# Patient Record
Sex: MALE | Race: WHITE | NOT HISPANIC OR LATINO | Employment: OTHER | URBAN - METROPOLITAN AREA
[De-identification: names, ages, dates, MRNs, and addresses within clinical notes are randomized per-mention and may not be internally consistent; named-entity substitution may affect disease eponyms.]

---

## 2018-06-18 ENCOUNTER — HOSPITAL ENCOUNTER (EMERGENCY)
Facility: HOSPITAL | Age: 83
Discharge: HOME | End: 2018-06-18
Attending: EMERGENCY MEDICINE | Admitting: EMERGENCY MEDICINE
Payer: COMMERCIAL

## 2018-06-18 ENCOUNTER — APPOINTMENT (EMERGENCY)
Dept: RADIOLOGY | Facility: HOSPITAL | Age: 83
End: 2018-06-18
Payer: COMMERCIAL

## 2018-06-18 VITALS
DIASTOLIC BLOOD PRESSURE: 66 MMHG | RESPIRATION RATE: 16 BRPM | SYSTOLIC BLOOD PRESSURE: 147 MMHG | WEIGHT: 175.71 LBS | TEMPERATURE: 98.3 F | HEART RATE: 74 BPM | OXYGEN SATURATION: 97 % | BODY MASS INDEX: 22.56 KG/M2

## 2018-06-18 DIAGNOSIS — T67.5XXA HEAT EXHAUSTION, INITIAL ENCOUNTER: Primary | ICD-10-CM

## 2018-06-18 LAB
ALBUMIN SERPL BCP-MCNC: 3.4 G/DL (ref 3.5–5)
ALP SERPL-CCNC: 80 U/L (ref 46–116)
ALT SERPL W P-5'-P-CCNC: 29 U/L (ref 12–78)
ANION GAP SERPL CALCULATED.3IONS-SCNC: 7 MMOL/L (ref 4–13)
AST SERPL W P-5'-P-CCNC: 22 U/L (ref 5–45)
BASOPHILS # BLD AUTO: 0.03 THOUSANDS/ΜL (ref 0–0.1)
BASOPHILS NFR BLD AUTO: 0 % (ref 0–1)
BILIRUB SERPL-MCNC: 0.5 MG/DL (ref 0.2–1)
BILIRUB UR QL STRIP: NEGATIVE
BUN SERPL-MCNC: 16 MG/DL (ref 5–25)
CALCIUM SERPL-MCNC: 9 MG/DL (ref 8.3–10.1)
CHLORIDE SERPL-SCNC: 102 MMOL/L (ref 100–108)
CLARITY UR: CLEAR
CO2 SERPL-SCNC: 30 MMOL/L (ref 21–32)
COLOR UR: YELLOW
CREAT SERPL-MCNC: 0.89 MG/DL (ref 0.6–1.3)
EOSINOPHIL # BLD AUTO: 0.33 THOUSAND/ΜL (ref 0–0.61)
EOSINOPHIL NFR BLD AUTO: 4 % (ref 0–6)
ERYTHROCYTE [DISTWIDTH] IN BLOOD BY AUTOMATED COUNT: 13.3 % (ref 11.6–15.1)
GFR SERPL CREATININE-BSD FRML MDRD: 76 ML/MIN/1.73SQ M
GLUCOSE SERPL-MCNC: 107 MG/DL (ref 65–140)
GLUCOSE UR STRIP-MCNC: NEGATIVE MG/DL
HCT VFR BLD AUTO: 42.3 % (ref 36.5–49.3)
HGB BLD-MCNC: 14.5 G/DL (ref 12–17)
HGB UR QL STRIP.AUTO: NEGATIVE
KETONES UR STRIP-MCNC: ABNORMAL MG/DL
LEUKOCYTE ESTERASE UR QL STRIP: NEGATIVE
LYMPHOCYTES # BLD AUTO: 2.1 THOUSANDS/ΜL (ref 0.6–4.47)
LYMPHOCYTES NFR BLD AUTO: 28 % (ref 14–44)
MAGNESIUM SERPL-MCNC: 2.2 MG/DL (ref 1.6–2.6)
MCH RBC QN AUTO: 32.2 PG (ref 26.8–34.3)
MCHC RBC AUTO-ENTMCNC: 34.3 G/DL (ref 31.4–37.4)
MCV RBC AUTO: 94 FL (ref 82–98)
MONOCYTES # BLD AUTO: 0.85 THOUSAND/ΜL (ref 0.17–1.22)
MONOCYTES NFR BLD AUTO: 11 % (ref 4–12)
NEUTROPHILS # BLD AUTO: 4.12 THOUSANDS/ΜL (ref 1.85–7.62)
NEUTS SEG NFR BLD AUTO: 56 % (ref 43–75)
NITRITE UR QL STRIP: NEGATIVE
NT-PROBNP SERPL-MCNC: 78 PG/ML
PH UR STRIP.AUTO: 6 [PH] (ref 4.5–8)
PLATELET # BLD AUTO: 247 THOUSANDS/UL (ref 149–390)
PMV BLD AUTO: 9.7 FL (ref 8.9–12.7)
POTASSIUM SERPL-SCNC: 4 MMOL/L (ref 3.5–5.3)
PROT SERPL-MCNC: 6.9 G/DL (ref 6.4–8.2)
PROT UR STRIP-MCNC: NEGATIVE MG/DL
RBC # BLD AUTO: 4.51 MILLION/UL (ref 3.88–5.62)
SODIUM SERPL-SCNC: 139 MMOL/L (ref 136–145)
SP GR UR STRIP.AUTO: <=1.005 (ref 1–1.03)
TROPONIN I SERPL-MCNC: <0.02 NG/ML
UROBILINOGEN UR QL STRIP.AUTO: 0.2 E.U./DL
WBC # BLD AUTO: 7.43 THOUSAND/UL (ref 4.31–10.16)

## 2018-06-18 PROCEDURE — 36415 COLL VENOUS BLD VENIPUNCTURE: CPT | Performed by: EMERGENCY MEDICINE

## 2018-06-18 PROCEDURE — 96361 HYDRATE IV INFUSION ADD-ON: CPT

## 2018-06-18 PROCEDURE — 99284 EMERGENCY DEPT VISIT MOD MDM: CPT

## 2018-06-18 PROCEDURE — 81003 URINALYSIS AUTO W/O SCOPE: CPT | Performed by: EMERGENCY MEDICINE

## 2018-06-18 PROCEDURE — 93005 ELECTROCARDIOGRAM TRACING: CPT

## 2018-06-18 PROCEDURE — 85025 COMPLETE CBC W/AUTO DIFF WBC: CPT | Performed by: EMERGENCY MEDICINE

## 2018-06-18 PROCEDURE — 84484 ASSAY OF TROPONIN QUANT: CPT | Performed by: EMERGENCY MEDICINE

## 2018-06-18 PROCEDURE — 96360 HYDRATION IV INFUSION INIT: CPT

## 2018-06-18 PROCEDURE — 80053 COMPREHEN METABOLIC PANEL: CPT | Performed by: EMERGENCY MEDICINE

## 2018-06-18 PROCEDURE — 83735 ASSAY OF MAGNESIUM: CPT | Performed by: EMERGENCY MEDICINE

## 2018-06-18 PROCEDURE — 71045 X-RAY EXAM CHEST 1 VIEW: CPT

## 2018-06-18 PROCEDURE — 83880 ASSAY OF NATRIURETIC PEPTIDE: CPT | Performed by: EMERGENCY MEDICINE

## 2018-06-18 RX ORDER — SODIUM CHLORIDE 9 MG/ML
125 INJECTION, SOLUTION INTRAVENOUS CONTINUOUS
Status: DISCONTINUED | OUTPATIENT
Start: 2018-06-18 | End: 2018-06-19 | Stop reason: HOSPADM

## 2018-06-18 RX ADMIN — SODIUM CHLORIDE 125 ML/HR: 0.9 INJECTION, SOLUTION INTRAVENOUS at 20:43

## 2018-06-19 LAB
ATRIAL RATE: 72 BPM
P AXIS: 72 DEGREES
PR INTERVAL: 182 MS
QRS AXIS: 60 DEGREES
QRSD INTERVAL: 90 MS
QT INTERVAL: 366 MS
QTC INTERVAL: 403 MS
T WAVE AXIS: 70 DEGREES
VENTRICULAR RATE: 73 BPM

## 2018-06-19 PROCEDURE — 93010 ELECTROCARDIOGRAM REPORT: CPT | Performed by: INTERNAL MEDICINE

## 2018-06-19 NOTE — DISCHARGE INSTRUCTIONS
Heat Exhaustion   WHAT YOU NEED TO KNOW:   Heat exhaustion is when your body overheats  Normally, the body has a cooling system that is controlled by the brain  The cooling system adjusts to hot conditions and lowers your body temperature by producing sweat  With heat exhaustion, the body's cooling system is not working well and results in an increased body temperature  DISCHARGE INSTRUCTIONS:   Call 911 for any of the following:   · You have trouble breathing  · You are confused or cannot think clearly  · You cannot move your arms and legs  Return to the emergency department if:   · You cannot stop vomiting  Contact your healthcare provider if:   · Your signs and symptoms do not improve with treatment  · You have numbness or prickling feeling in your arms or legs  · You have questions or concerns about your condition or care  First aid for heat exhaustion:   · Move to an air-conditioned location or a cool, shady area and lie down  Raise your legs above the level of your heart  · Drink cold liquid, such as water or a sports drink  · Mist yourself with cold water or pour cool water on your head, neck, and clothes  · Loosen or remove as many clothes as possible  · If you do not feel better in 1 hour, go to the emergency department  Prevent heat exhaustion:   · Wear lightweight, loose, and light-colored clothing  · Protect your head and neck with a hat or umbrella when you are outdoors  · Drink lots of water or sports drinks  Avoid alcohol  · Eat salty foods, such as salted crackers, and salted pretzels  · Limit your activities during the hottest time of the day  This is usually late morning through early afternoon  · Use air conditioners or fans and have enough proper ventilation  If there is no air conditioning available, keep your windows open so air can circulate    Follow up with your healthcare provider as directed:  Write down your questions so you remember to ask them during your visits  © 2017 [employer] Information is for End User's use only and may not be sold, redistributed or otherwise used for commercial purposes  All illustrations and images included in CareNotes® are the copyrighted property of A D A M , Inc  or Jourdan Romero  The above information is an  only  It is not intended as medical advice for individual conditions or treatments  Talk to your doctor, nurse or pharmacist before following any medical regimen to see if it is safe and effective for you

## 2018-06-19 NOTE — ED NOTES
Pt discharge instructions reviewed, Pt has no further questions at this time  Pt awake and alert, no signs of acute distress noted         Courtney Aguilar RN  06/18/18 1597

## 2018-06-19 NOTE — ED NOTES
Pt resting in bed finishing his NSS fluids as requested, family bedside, discharge papers given along with resources, VSS, will continue to monitor       Raimundo Heath RN  06/18/18 5294

## 2018-06-19 NOTE — ED PROVIDER NOTES
History  Chief Complaint   Patient presents with    Dehydration     daughter reports "he doesnt want to eat or drink anything  it happened before and he dehydrated"      80-year-old gentleman brought in for generalized weakness  Daughter reports that he did not eat or drink much all day  Also refused turn on the air conditioning in his house  Is 90+ degrees outside  History provided by:  Patient and parent   used: No    Malaise - 7 years or greater   Severity:  Mild  Onset quality:  Sudden  Duration:  12 hours  Timing:  Constant  Progression:  Worsening  Context: dehydration    Worsened by: Activity  Associated symptoms: anorexia    Associated symptoms: no abdominal pain, no arthralgias, no chest pain, no cough, no dizziness, no headaches, no seizures and no vomiting    Risk factors: no anemia, no heart disease and no new medications        None       Past Medical History:   Diagnosis Date    [facility] )     [facility])        Past Surgical History:   Procedure Laterality Date    PROSTATE SURGERY         History reviewed  No pertinent family history  I have reviewed and agree with the history as documented  Social History   Substance Use Topics    Smoking status: Never Smoker    Smokeless tobacco: Never Used    Alcohol use Not on file      Comment: one with dinner every night        Review of Systems   Constitutional: Negative for activity change and appetite change  HENT: Negative for congestion and facial swelling  Eyes: Negative for discharge and redness  Respiratory: Negative for cough and wheezing  Cardiovascular: Negative for chest pain and leg swelling  Gastrointestinal: Positive for anorexia  Negative for abdominal distention, abdominal pain, blood in stool and vomiting  Endocrine: Negative for cold intolerance and polydipsia  Genitourinary: Negative for difficulty urinating and hematuria     Musculoskeletal: Negative for arthralgias and gait problem  Skin: Negative for color change and rash  Allergic/Immunologic: Negative for food allergies and immunocompromised state  Neurological: Negative for dizziness, seizures and headaches  Hematological: Negative for adenopathy  Does not bruise/bleed easily  Psychiatric/Behavioral: Negative for agitation, confusion and decreased concentration  All other systems reviewed and are negative  Physical Exam  Physical Exam   Constitutional: He is oriented to person, place, and time  He appears well-developed and well-nourished  Non-toxic appearance  HENT:   Head: Normocephalic and atraumatic  Right Ear: Tympanic membrane normal    Left Ear: Tympanic membrane normal    Nose: Nose normal    Mouth/Throat: Oropharynx is clear and moist    Eyes: Conjunctivae, EOM and lids are normal  Pupils are equal, round, and reactive to light  Neck: Trachea normal and normal range of motion  Neck supple  No JVD present  Carotid bruit is not present  Cardiovascular: Normal rate, regular rhythm, normal heart sounds and intact distal pulses  No extrasystoles are present  Pulmonary/Chest: Effort normal  He has no decreased breath sounds  He has no wheezes  He has no rhonchi  He has no rales  He exhibits no tenderness and no deformity  Abdominal: Soft  Bowel sounds are normal  There is no tenderness  There is no rebound, no guarding and no CVA tenderness  Musculoskeletal:        Right shoulder: He exhibits normal range of motion, no tenderness, no swelling and no deformity  Cervical back: Normal  He exhibits normal range of motion, no tenderness, no bony tenderness and no deformity  Lymphadenopathy:     He has no cervical adenopathy  He has no axillary adenopathy  Neurological: He is alert and oriented to person, place, and time  He has normal strength and normal reflexes  No cranial nerve deficit or sensory deficit  He displays a negative Romberg sign  Skin: Skin is warm and dry  Psychiatric: He has a normal mood and affect  His speech is normal and behavior is normal  Judgment and thought content normal  Cognition and memory are normal    Nursing note and vitals reviewed        Vital Signs  ED Triage Vitals   Temperature Pulse Respirations Blood Pressure SpO2   06/18/18 1924 06/18/18 1924 06/18/18 1924 06/18/18 1924 06/18/18 1924   98 3 °F (36 8 °C) 73 18 123/64 97 %      Temp Source Heart Rate Source Patient Position - Orthostatic VS BP Location FiO2 (%)   06/18/18 1924 06/18/18 1924 06/18/18 1924 06/18/18 1924 --   Oral Monitor Lying Left arm       Pain Score       06/18/18 2020       No Pain           Vitals:    06/18/18 2230 06/18/18 2245 06/18/18 2300 06/18/18 2330   BP: 134/63  145/67 147/66   Pulse: 70 72 72 74   Patient Position - Orthostatic VS: Lying  Lying Lying       Visual Acuity      ED Medications  Medications - No data to display    Diagnostic Studies  Results Reviewed     Procedure Component Value Units Date/Time    UA w Reflex to Microscopic w Reflex to Culture [[id number]]  (Abnormal) Collected:  06/18/18 2220    Lab Status:  Final result Specimen:  Urine from Urine, Clean Catch Updated:  06/18/18 2229     Color, UA Yellow     Clarity, UA Clear     Specific Gravity, UA <=1 005     pH, UA 6 0     Leukocytes, UA Negative     Nitrite, UA Negative     Protein, UA Negative mg/dl      Glucose, UA Negative mg/dl      Ketones, UA 15 (1+) (A) mg/dl      Urobilinogen, UA 0 2 E U /dl      Bilirubin, UA Negative     Blood, UA Negative    Magnesium [[id number]]  (Normal) Collected:  06/18/18 2041    Lab Status:  Final result Specimen:  Blood from Arm, Right Updated:  06/18/18 2121     Magnesium 2 2 mg/dL     B-type natriuretic peptide [[id number]]  (Normal) Collected:  06/18/18 2041    Lab Status:  Final result Specimen:  Blood from Arm, Right Updated:  06/18/18 2121     NT-proBNP 78 pg/mL     Troponin I [[id number]]  (Normal) Collected:  06/18/18 2041    Lab Status:  Final result Specimen: Blood from Arm, Right Updated:  06/18/18 2116     Troponin I <0 02 ng/mL     Comprehensive metabolic panel [[id number]]  (Abnormal) Collected:  06/18/18 2041    Lab Status:  Final result Specimen:  Blood from Arm, Right Updated:  06/18/18 2114     Sodium 139 mmol/L      Potassium 4 0 mmol/L      Chloride 102 mmol/L      CO2 30 mmol/L      Anion Gap 7 mmol/L      BUN 16 mg/dL      Creatinine 0 89 mg/dL      Glucose 107 mg/dL      Calcium 9 0 mg/dL      AST 22 U/L      ALT 29 U/L      Alkaline Phosphatase 80 U/L      Total Protein 6 9 g/dL      Albumin 3 4 (L) g/dL      Total Bilirubin 0 50 mg/dL      eGFR 76 ml/min/1 73sq m     Narrative:         National Kidney Disease Education Program recommendations are as follows:  GFR calculation is accurate only with a steady state creatinine  Chronic Kidney disease less than 60 ml/min/1 73 sq  meters  Kidney failure less than 15 ml/min/1 73 sq  meters      CBC and differential [[id number]]  (Normal) Collected:  06/18/18 2041    Lab Status:  Final result Specimen:  Blood from Arm, Right Updated:  06/18/18 2058     WBC 7 43 Thousand/uL      RBC 4 51 Million/uL      Hemoglobin 14 5 g/dL      Hematocrit 42 3 %      MCV 94 fL      MCH 32 2 pg      MCHC 34 3 g/dL      RDW 13 3 %      MPV 9 7 fL      Platelets 091 Thousands/uL      Neutrophils Relative 56 %      Lymphocytes Relative 28 %      Monocytes Relative 11 %      Eosinophils Relative 4 %      Basophils Relative 0 %      Neutrophils Absolute 4 12 Thousands/µL      Lymphocytes Absolute 2 10 Thousands/µL      Monocytes Absolute 0 85 Thousand/µL      Eosinophils Absolute 0 33 Thousand/µL      Basophils Absolute 0 03 Thousands/µL                  XR chest 1 view portable   Final Result by Valeria Hough MD (06/19 0703)      No evidence of acute abnormality in the chest             Workstation performed: [id number]                    Procedures  Procedures       Phone Contacts  ED Phone Contact    ED Course MDM  Number of Diagnoses or Management Options  Heat exhaustion, initial encounter: new and requires workup     Amount and/or Complexity of Data Reviewed  Clinical lab tests: ordered and reviewed  Tests in the radiology section of CPT®: ordered and reviewed  Tests in the medicine section of CPT®: ordered and reviewed    Risk of Complications, Morbidity, and/or Mortality  General comments: Discussed with family gave them information for the visiting nursing so sedation to come out to evaluate parents home and also for local geriatric shins    Patient Progress  Patient progress: stable    CritCare Time    Disposition  Final diagnoses:   Heat exhaustion, initial encounter     Time reflects when diagnosis was documented in both MDM as applicable and the Disposition within this note     Time User Action Codes Description Comment    6/18/2018 10:52 PM Abdirahman BRAND Add Chris Law  5XXA] Heat exhaustion, initial encounter       ED Disposition     ED Disposition Condition Comment    Discharge  [facility] [address] discharge to home/self care  Condition at discharge: Good        Follow-up Information     Follow up With Specialties Details Why [address] Internal Medicine Schedule an appointment as soon as possible for a visit  [address] [place] [address]             There are no discharge medications for this patient  No discharge procedures on file      ED Provider  Electronically Signed by           Juanis Whitt DO  06/20/18 9805

## 2018-07-11 ENCOUNTER — OFFICE VISIT (OUTPATIENT)
Dept: SURGICAL ONCOLOGY | Facility: CLINIC | Age: 83
End: 2018-07-11
Payer: COMMERCIAL

## 2018-07-11 VITALS
HEART RATE: 82 BPM | HEIGHT: 74 IN | WEIGHT: 174 LBS | TEMPERATURE: 97.7 F | SYSTOLIC BLOOD PRESSURE: 160 MMHG | RESPIRATION RATE: 16 BRPM | BODY MASS INDEX: 22.33 KG/M2 | DIASTOLIC BLOOD PRESSURE: 70 MMHG

## 2018-07-11 DIAGNOSIS — C44.629 SQUAMOUS CELL CARCINOMA OF LEFT UPPER EXTREMITY: Primary | ICD-10-CM

## 2018-07-11 PROCEDURE — 99204 OFFICE O/P NEW MOD 45 MIN: CPT | Performed by: SURGERY

## 2018-07-11 NOTE — LETTER
July 11, 2018     Marcia Gardner  [address] [place] [address] [facility] [place]  [address] [place] [address]    Patient: Nany Gonzales   YOB: 1928   Date of Visit: 7/11/2018       Dear Dr Trinidad Hay:    Thank you for referring Angelina Gay to me for evaluation  Below are my notes for this consultation  If you have questions, please do not hesitate to call me  I look forward to following your patient along with you  Sincerely,        Edwin Lemons MD        CC: No Recipients  Edwin Lemons MD  7/11/2018  1:30 PM  Sign at close encounter               Surgical Oncology Follow Up       [address] [place] [address]  8/5/1928  [id number]  [address] [facility],[address]  [facility] [address] [address]    Chief Complaint   Patient presents with   [address] [place] [address] patient here about basal cell on elbow       Assessment/Plan:    No problem-specific Assessment & Plan notes found for this encounter  Diagnoses and all orders for this visit:    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        Advance Care Planning/Advance Directives:  Discussed disease status, cancer treatment plans and/or cancer treatment goals with the patient  No history exists  History of Present Illness:   Patient is an 80year old man with a left elbow lesion which was noticed by the Dermatology team   Biopsy was done confirming squamous cell cancer  The patient reports that he had a itchy skin lesion there which she had been scratching for some time  He has had history of other skin cancers treated in the past   He has fair skin and sunburns easily  Review of Systems   Constitutional: Negative  HENT: Negative  Eyes: Negative  Respiratory: Negative  Cardiovascular: Negative  Gastrointestinal: Negative  Endocrine: Negative  Genitourinary: Negative  Musculoskeletal: Negative  Skin:        Left elbow skin lesion   Allergic/Immunologic: Negative  Neurological: Negative  Hematological: Negative  Psychiatric/Behavioral: Negative  Patient Active Problem List   Diagnosis    [facility] cancer ([facility] )    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     Past Medical History:   Diagnosis Date    Melanoma ([facility] )     Prostate CA ([facility] )      Past Surgical History:   Procedure Laterality Date    PROSTATE SURGERY       Family History   Problem Relation Age of Onset    Lung cancer Father      Social History     Social History    Marital status: /Civil Union     Spouse name: N/A    Number of children: N/A    Years of education: N/A     Occupational History    Not on file  Social History Main Topics    Smoking status: Never Smoker    Smokeless tobacco: Never Used    Alcohol use 0 6 oz/week     1 Cans of beer per week      Comment: one with dinner every night    Drug use: No    Sexual activity: Not on file     Other Topics Concern    Not on file     Social History Narrative    No narrative on file     No current outpatient prescriptions on file  No Known Allergies  Vitals:    07/11/18 1256   BP: 160/70   Pulse: 82   Resp: 16   Temp: 97 7 °F (36 5 °C)       Physical Exam   Constitutional: He is oriented to person, place, and time  Eyes: Conjunctivae are normal  Pupils are equal, round, and reactive to light  Neck: Normal range of motion  Neck supple  Cardiovascular: Normal rate, regular rhythm, normal heart sounds and intact distal pulses  Pulmonary/Chest: Effort normal and breath sounds normal    Abdominal: Soft  Musculoskeletal: Normal range of motion  Neurological: He is alert and oriented to person, place, and time  Skin:   12 mm diameter of ulcerated lesion left elbow         Pathology:  Case Report   Surgical Pathology Report                         Case: [id number]                                    Authorizing Provider: Alexx Ramírez MD        Collected:           06/20/2018                    Pathologist:           Farideh Benjamin MD           Received:            06/20/2018 0805               Specimen:    Skin, Other, Left Elbow (4 slides  [facility],collected on  06/06/2018)       Final Diagnosis   A  Skin,  Left Elbow (4 slides [id number], L1 & L2,  [facility],collected on  06/06/2018):  - Ulcerated invasive squamous cell carcinoma, moderately to poorly differentiated with infiltrative     features, present with squamous cell carcinoma in-situ and occurring in a background of cellular     myxoid stroma with atypical features  See Note  -- Carcinoma extends to tissue borders       Interpretation performed at [facility], [address] [place] [address]  Labs:  CBC, Coags, BMP, Mg, Phos     Imaging  Xr Chest 1 View Portable    Result Date: 6/19/2018  Narrative: CHEST INDICATION:   weak  COMPARISON:  None EXAM PERFORMED/VIEWS:  XR CHEST PORTABLE FINDINGS: Heart mildly enlarged  Aorta uncoiled  Central pulmonary arteries prominent  Peripheral pulmonary vessels unremarkable  The lungs are clear  No pneumothorax or pleural effusion  Osseous structures appear within normal limits for patient age  Impression: No evidence of acute abnormality in the chest  Workstation performed: [id number]     I reviewed the above laboratory and imaging data  Discussion/Summary:  Left elbow squamous cell cancer  I believe this is amenable to in office excision  Patient is daughter are amenable to this and will schedule a time and date for in office procedure

## 2018-07-11 NOTE — PROGRESS NOTES
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        Advance Care Planning/Advance Directives:  Discussed disease status, cancer treatment plans and/or cancer treatment goals with the patient  No history exists  History of Present Illness:   Patient is an 80year old man with a left elbow lesion which was noticed by the Dermatology team   Biopsy was done confirming squamous cell cancer  The patient reports that he had a itchy skin lesion there which she had been scratching for some time  He has had history of other skin cancers treated in the past   He has fair skin and sunburns easily  Review of Systems   Constitutional: Negative  HENT: Negative  Eyes: Negative  Respiratory: Negative  Cardiovascular: Negative  Gastrointestinal: Negative  Endocrine: Negative  Genitourinary: Negative  Musculoskeletal: Negative  Skin:        Left elbow skin lesion   Allergic/Immunologic: Negative  Neurological: Negative  Hematological: Negative  Psychiatric/Behavioral: Negative            Patient Active Problem List   Diagnosis    [facility] )    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     Past Medical History:   Diagnosis Date    Melanoma ([facility] )     [facility]state CA [facility])      Past Surgical History:   Procedure Laterality Date    PROSTATE SURGERY       Family History   Problem Relation Age of Onset    Lung cancer Father      Social History     Social History    Marital status: /Civil Union     Spouse name: N/A    Number of children: N/A    Years of education: N/A     Occupational History    Not on file  Social History Main Topics    Smoking status: Never Smoker    Smokeless tobacco: Never Used    Alcohol use 0 6 oz/week     1 Cans of beer per week      Comment: one with dinner every night    Drug use: No    Sexual activity: Not on file     Other Topics Concern    Not on file     Social History Narrative    No narrative on file     No current outpatient prescriptions on file  No Known Allergies  Vitals:    07/11/18 1256   BP: 160/70   Pulse: 82   Resp: 16   Temp: 97 7 °F (36 5 °C)       Physical Exam   Constitutional: He is oriented to person, place, and time  Eyes: Conjunctivae are normal  Pupils are equal, round, and reactive to light  Neck: Normal range of motion  Neck supple  Cardiovascular: Normal rate, regular rhythm, normal heart sounds and intact distal pulses  Pulmonary/Chest: Effort normal and breath sounds normal    Abdominal: Soft  Musculoskeletal: Normal range of motion  Neurological: He is alert and oriented to person, place, and time  Skin:   12 mm diameter of ulcerated lesion left elbow  Pathology:  Case Report   Surgical Pathology Report                         Case: [id number]                                    Authorizing Provider: Luzmaria Tinoco MD        Collected:           06/20/2018                    Pathologist:           Samual Ormond, MD           Received:            06/20/2018 0805               Specimen:    Skin, Other, Left Elbow (4 slides  [facility],collected on  06/06/2018)       Final Diagnosis   A   Skin,  Left Elbow (4 slides [id number], L1 & L2,  [facility],collected on  06/06/2018):  - Ulcerated invasive squamous cell carcinoma, moderately to poorly differentiated with infiltrative     features, present with squamous cell carcinoma in-situ and occurring in a background of cellular     myxoid stroma with atypical features  See Note  -- Carcinoma extends to tissue borders       Interpretation performed at [facility], [facility] [address]  Labs:  CBC, Coags, BMP, Mg, Phos     Imaging  Xr Chest 1 View Portable    Result Date: 6/19/2018  Narrative: CHEST INDICATION:   weak  COMPARISON:  None EXAM PERFORMED/VIEWS:  XR CHEST PORTABLE FINDINGS: Heart mildly enlarged  Aorta uncoiled  Central pulmonary arteries prominent  Peripheral pulmonary vessels unremarkable  The lungs are clear  No pneumothorax or pleural effusion  Osseous structures appear within normal limits for patient age  Impression: No evidence of acute abnormality in the chest  Workstation performed: [id number]     I reviewed the above laboratory and imaging data  Discussion/Summary:  Left elbow squamous cell cancer  I believe this is amenable to in office excision  Patient is daughter are amenable to this and will schedule a time and date for in office procedure

## 2018-08-17 ENCOUNTER — PROCEDURE VISIT (OUTPATIENT)
Dept: SURGICAL ONCOLOGY | Facility: CLINIC | Age: 83
End: 2018-08-17
Payer: COMMERCIAL

## 2018-08-17 VITALS
RESPIRATION RATE: 16 BRPM | DIASTOLIC BLOOD PRESSURE: 60 MMHG | HEART RATE: 74 BPM | BODY MASS INDEX: 21.05 KG/M2 | HEIGHT: 74 IN | WEIGHT: 164 LBS | TEMPERATURE: 98.3 F | SYSTOLIC BLOOD PRESSURE: 120 MMHG

## 2018-08-17 DIAGNOSIS — C44.629 SQUAMOUS CELL CARCINOMA OF SKIN OF LEFT ELBOW: Primary | ICD-10-CM

## 2018-08-17 PROCEDURE — 88305 TISSUE EXAM BY PATHOLOGIST: CPT | Performed by: PATHOLOGY

## 2018-08-17 PROCEDURE — 11602 EXC TR-EXT MAL+MARG 1.1-2 CM: CPT | Performed by: SURGERY

## 2018-08-17 PROCEDURE — 99214 OFFICE O/P EST MOD 30 MIN: CPT | Performed by: SURGERY

## 2018-08-17 NOTE — PROGRESS NOTES
Skin excision  Date/Time: 8/17/2018 10:43 AM  Performed by: Ti Shelton  Authorized by: Ti Shelton     Procedure Details - Skin Excision:     Number of Lesions:  1  Lesion 1:     Body area:  Upper extremity    Upper extremity location:  L elbow    Initial size (mm):  8       Final defect size (mm):  20    Malignancy: malignant lesion      Cosmetic?: Yes      Repair type:  Linear closure    Closure complexity: intermediate      Surgical defect:  20 mm    Repair size (cm):  2     The left elbow site was prepped and draped in the usual fashion  It was anesthetized with 10 cc of local anesthesia  3-4 mm margins were drawn around the scab  Lines were then incised sharply  Cautery was used to dissect the dermis and subcutaneous tissue to obtain a full-thickness excision  Closure was performed using 3-0 Vicryl for the dermis followed by 4-0 Monocryl for subcuticular skin closure  Histacryl was then applied  Patient tolerated procedure well

## 2018-08-17 NOTE — LETTER
August 17, 2018     [place] [address]    Patient: Kerry Orozco   YOB: 1928   Date of Visit: 8/17/2018       Dear Dr Davis Morris:    Thank you for referring Jesse Lin to me for evaluation  Below are my notes for this consultation  If you have questions, please do not hesitate to call me  I look forward to following your patient along with you  Sincerely,        Juice Box MD        CC: No Recipients  Juice Box MD  8/17/2018 10:45 AM  Sign at close encounter  Skin excision  Date/Time: 8/17/2018 10:43 AM  Performed by: Ciro Mansfield  Authorized by: Ciro Mansfield     Procedure Details - Skin Excision:     Number of Lesions:  1  Lesion 1:     Body area:  Upper extremity    Upper extremity location:  L elbow    Initial size (mm):  8       Final defect size (mm):  20    Malignancy: malignant lesion      Cosmetic?: Yes      Repair type:  Linear closure    Closure complexity: intermediate      Surgical defect:  20 mm    Repair size (cm):  2     The left elbow site was prepped and draped in the usual fashion  It was anesthetized with 10 cc of local anesthesia  3-4 mm margins were drawn around the scab  Lines were then incised sharply  Cautery was used to dissect the dermis and subcutaneous tissue to obtain a full-thickness excision  Closure was performed using 3-0 Vicryl for the dermis followed by 4-0 Monocryl for subcuticular skin closure  Histacryl was then applied  Patient tolerated procedure well

## 2018-09-04 ENCOUNTER — APPOINTMENT (EMERGENCY)
Dept: RADIOLOGY | Facility: HOSPITAL | Age: 83
End: 2018-09-04
Payer: COMMERCIAL

## 2018-09-04 ENCOUNTER — HOSPITAL ENCOUNTER (EMERGENCY)
Facility: HOSPITAL | Age: 83
Discharge: HOME | End: 2018-09-04
Attending: EMERGENCY MEDICINE | Admitting: EMERGENCY MEDICINE
Payer: COMMERCIAL

## 2018-09-04 ENCOUNTER — OFFICE VISIT (OUTPATIENT)
Dept: SURGICAL ONCOLOGY | Facility: CLINIC | Age: 83
End: 2018-09-04
Payer: COMMERCIAL

## 2018-09-04 VITALS
TEMPERATURE: 97.6 F | RESPIRATION RATE: 16 BRPM | BODY MASS INDEX: 21.05 KG/M2 | OXYGEN SATURATION: 98 % | SYSTOLIC BLOOD PRESSURE: 154 MMHG | DIASTOLIC BLOOD PRESSURE: 67 MMHG | HEART RATE: 70 BPM | HEIGHT: 74 IN | WEIGHT: 164.02 LBS

## 2018-09-04 DIAGNOSIS — C44.629 SQUAMOUS CELL CARCINOMA OF LEFT UPPER EXTREMITY: Primary | ICD-10-CM

## 2018-09-04 DIAGNOSIS — R45.1 AGITATION: Primary | ICD-10-CM

## 2018-09-04 LAB
ANION GAP SERPL CALCULATED.3IONS-SCNC: 7 MMOL/L (ref 4–13)
BASOPHILS # BLD AUTO: 0.04 THOUSANDS/ΜL (ref 0–0.1)
BASOPHILS NFR BLD AUTO: 0 % (ref 0–1)
BILIRUB UR QL STRIP: NEGATIVE
BUN SERPL-MCNC: 14 MG/DL (ref 5–25)
CALCIUM SERPL-MCNC: 8.8 MG/DL (ref 8.3–10.1)
CHLORIDE SERPL-SCNC: 102 MMOL/L (ref 100–108)
CLARITY UR: CLEAR
CO2 SERPL-SCNC: 29 MMOL/L (ref 21–32)
COLOR UR: YELLOW
COLOR, POC: YELLOW
CREAT SERPL-MCNC: 0.81 MG/DL (ref 0.6–1.3)
EOSINOPHIL # BLD AUTO: 0.07 THOUSAND/ΜL (ref 0–0.61)
EOSINOPHIL NFR BLD AUTO: 1 % (ref 0–6)
ERYTHROCYTE [DISTWIDTH] IN BLOOD BY AUTOMATED COUNT: 13.2 % (ref 11.6–15.1)
GFR SERPL CREATININE-BSD FRML MDRD: 78 ML/MIN/1.73SQ M
GLUCOSE SERPL-MCNC: 102 MG/DL (ref 65–140)
GLUCOSE UR STRIP-MCNC: NEGATIVE MG/DL
HCT VFR BLD AUTO: 40.6 % (ref 36.5–49.3)
HGB BLD-MCNC: 13.9 G/DL (ref 12–17)
HGB UR QL STRIP.AUTO: NEGATIVE
IMM GRANULOCYTES # BLD AUTO: 0.03 THOUSAND/UL (ref 0–0.2)
IMM GRANULOCYTES NFR BLD AUTO: 0 % (ref 0–2)
KETONES UR STRIP-MCNC: NEGATIVE MG/DL
LEUKOCYTE ESTERASE UR QL STRIP: NEGATIVE
LYMPHOCYTES # BLD AUTO: 0.96 THOUSANDS/ΜL (ref 0.6–4.47)
LYMPHOCYTES NFR BLD AUTO: 10 % (ref 14–44)
MCH RBC QN AUTO: 32.3 PG (ref 26.8–34.3)
MCHC RBC AUTO-ENTMCNC: 34.2 G/DL (ref 31.4–37.4)
MCV RBC AUTO: 94 FL (ref 82–98)
MONOCYTES # BLD AUTO: 0.91 THOUSAND/ΜL (ref 0.17–1.22)
MONOCYTES NFR BLD AUTO: 10 % (ref 4–12)
NEUTROPHILS # BLD AUTO: 7.58 THOUSANDS/ΜL (ref 1.85–7.62)
NEUTS SEG NFR BLD AUTO: 79 % (ref 43–75)
NITRITE UR QL STRIP: NEGATIVE
NRBC BLD AUTO-RTO: 0 /100 WBCS
PH UR STRIP.AUTO: 7 [PH] (ref 4.5–8)
PLATELET # BLD AUTO: 246 THOUSANDS/UL (ref 149–390)
PMV BLD AUTO: 9.8 FL (ref 8.9–12.7)
POTASSIUM SERPL-SCNC: 3.7 MMOL/L (ref 3.5–5.3)
PROT UR STRIP-MCNC: NEGATIVE MG/DL
RBC # BLD AUTO: 4.31 MILLION/UL (ref 3.88–5.62)
SODIUM SERPL-SCNC: 138 MMOL/L (ref 136–145)
SP GR UR STRIP.AUTO: 1.01 (ref 1–1.03)
UROBILINOGEN UR QL STRIP.AUTO: 1 E.U./DL
WBC # BLD AUTO: 9.59 THOUSAND/UL (ref 4.31–10.16)

## 2018-09-04 PROCEDURE — 97167 OT EVAL HIGH COMPLEX 60 MIN: CPT

## 2018-09-04 PROCEDURE — G8987 SELF CARE CURRENT STATUS: HCPCS

## 2018-09-04 PROCEDURE — G8988 SELF CARE GOAL STATUS: HCPCS

## 2018-09-04 PROCEDURE — 96372 THER/PROPH/DIAG INJ SC/IM: CPT

## 2018-09-04 PROCEDURE — 74177 CT ABD & PELVIS W/CONTRAST: CPT

## 2018-09-04 PROCEDURE — G8978 MOBILITY CURRENT STATUS: HCPCS

## 2018-09-04 PROCEDURE — 70450 CT HEAD/BRAIN W/O DYE: CPT

## 2018-09-04 PROCEDURE — 72125 CT NECK SPINE W/O DYE: CPT

## 2018-09-04 PROCEDURE — 85025 COMPLETE CBC W/AUTO DIFF WBC: CPT | Performed by: EMERGENCY MEDICINE

## 2018-09-04 PROCEDURE — G8979 MOBILITY GOAL STATUS: HCPCS

## 2018-09-04 PROCEDURE — 81003 URINALYSIS AUTO W/O SCOPE: CPT

## 2018-09-04 PROCEDURE — 4040F PNEUMOC VAC/ADMIN/RCVD: CPT | Performed by: SURGERY

## 2018-09-04 PROCEDURE — 36415 COLL VENOUS BLD VENIPUNCTURE: CPT | Performed by: EMERGENCY MEDICINE

## 2018-09-04 PROCEDURE — 99213 OFFICE O/P EST LOW 20 MIN: CPT | Performed by: SURGERY

## 2018-09-04 PROCEDURE — 80048 BASIC METABOLIC PNL TOTAL CA: CPT | Performed by: EMERGENCY MEDICINE

## 2018-09-04 PROCEDURE — 99285 EMERGENCY DEPT VISIT HI MDM: CPT

## 2018-09-04 PROCEDURE — 71260 CT THORAX DX C+: CPT

## 2018-09-04 PROCEDURE — 97163 PT EVAL HIGH COMPLEX 45 MIN: CPT

## 2018-09-04 RX ORDER — HALOPERIDOL 5 MG/ML
5 INJECTION INTRAMUSCULAR ONCE
Status: COMPLETED | OUTPATIENT
Start: 2018-09-04 | End: 2018-09-04

## 2018-09-04 RX ORDER — OLANZAPINE 10 MG/1
10 TABLET, ORALLY DISINTEGRATING ORAL
Qty: 8 TABLET | Refills: 0 | Status: SHIPPED | OUTPATIENT
Start: 2018-09-04

## 2018-09-04 RX ADMIN — HALOPERIDOL LACTATE 5 MG: 5 INJECTION, SOLUTION INTRAMUSCULAR at 15:02

## 2018-09-04 RX ADMIN — IOHEXOL 100 ML: 350 INJECTION, SOLUTION INTRAVENOUS at 13:27

## 2018-09-04 NOTE — PLAN OF CARE
Problem: PHYSICAL THERAPY ADULT  Goal: Performs mobility at highest level of function for planned discharge setting  See evaluation for individualized goals  Treatment/Interventions: Bed mobility, Gait training, Endurance training, Cognitive reorientation, LE strengthening/ROM, Functional transfer training, Spoke to nursing, Patient/family training, OT          See flowsheet documentation for full assessment, interventions and recommendations  Prognosis: Guarded  Problem List: Decreased safety awareness, Pain, Decreased coordination, Decreased cognition, Impaired judgement, Decreased mobility, Impaired balance, Decreased endurance  Assessment: Pt is 80 y o  male seen for PT evaluation s/p admit to [facility] on 9/4/2018 w/ acute change in mental status and increased aggression  PT consulted to assess pt's functional mobility and d/c needs  Order placed for PT eval and tx  Comorbidities affecting pt's physical performance at time of assessment include: confusion, dementia  PTA, pt was ambulates household distances and lives in multi-level home  Personal factors affecting pt at time of IE include: inaccessible home environment, lives in 2 story house, unable to perform dynamic tasks in community, decreased cognition, limited home support, positive fall history, decreased initiation and engagement, limited insight into impairments, inability to perform IADLs and inability to perform ADLs  Please find objective findings from PT assessment regarding body systems outlined above with impairments and limitations including impaired balance, decreased endurance, impaired coordination, gait deviations, pain, decreased activity tolerance, decreased functional mobility tolerance, decreased safety awareness, fall risk and decreased cognition  Pt demonstrated ability to complete bed mobility with Teri and single step instruction for safety   Initially ambulating demonstrated very unsteady gait requiring HHA x2 to maintain balance  Trial with RW which pt was able to be managed with Mod Ax1  Noted ataxic, parkinsionism type gait, with decreased foot clearance, festating, and R foot drag  Pt required constant A for device management and instruction for distance  Anticipate pt may require STR vs placement at this time as pt lives in multilevel home with only spouse  Daughter who is with the patient today lives 2 hours away  The following objective measures performed on IE also reveal limitations: Barthel Index: 35/100  Pt's clinical presentation is currently evolving seen in pt's presentation of acute confusion and agitation  Pt to benefit from continued PT tx to address deficits as defined above and maximize level of functional independent mobility and consistency  From PT/mobility standpoint, recommendation at time of d/c would be STR vs placement pending pt ability to progress with PT pending progress in order to facilitate return to PLOF  Barriers to Discharge: Inaccessible home environment, Decreased caregiver support (agitiation)     Recommendation: Short-term skilled PT (vs placement pending ability to progress)     PT - OK to Discharge:  (to facility with increased A)    See flowsheet documentation for full assessment

## 2018-09-04 NOTE — ED NOTES
Spoke with MD Liu Record made aware that radiology called about abnormal CT results      Andra Abarca RN  09/04/18 8601

## 2018-09-04 NOTE — LETTER
September 4, 2018     [place]  [address] [employer]  [address] [place] [address]    Patient: Duana Reason   YOB: 1928   Date of Visit: 9/4/2018       Dear Dr Garret Thorne:    Thank you for referring José Miguel Mcneil to me for evaluation  Below are my notes for this consultation  If you have questions, please do not hesitate to call me  I look forward to following your patient along with you  Sincerely,        Jose De La Cruz MD        CC: No Recipients  Jose De La Cruz MD  9/4/2018  9:45 AM  Sign at close encounter     Surgical Oncology Follow Up       [facility] [address]  [address]  [place] [address] [facility] [id number] [phone]    Dutommy Reason  8/5/1928  [id number]  [address] [facility] Frida Watkins  [address]  [place] [address] [facility] 12[address] [facility]  [phone]    Chief Complaint   Patient presents with    Follow-up     Patient is here for a 2 week follow up after in-office excision of squamous cell carcinoma  Assessment/Plan:    No problem-specific Assessment & Plan notes found for this encounter  Diagnoses and all orders for this visit:    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        Advance Care Planning/Advance Directives:  Discussed disease status, cancer treatment plans and/or cancer treatment goals with the patient  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    6/6/2018 Initial Diagnosis     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         6/6/2018 Biopsy       Skin,  Left Elbow (4 slides [id number], L1 & L2,  [facility],collected on  06/06/2018):  - Ulcerated invasive squamous cell carcinoma, moderately to poorly differentiated with infiltrative     features, present with squamous cell carcinoma in-situ and occurring in a background of cellular     myxoid stroma with atypical features  See Note  8/17/2018 Surgery     In office excision SCC left elbow            History of Present Illness:   Patient is here for post procedural check, status post excision of left arm squamous cell cancer   -Interval History:  No skin issues since procedure was performed  Review of Systems:  Review of Systems   Constitutional: Negative  HENT: Negative  Eyes: Negative  Respiratory: Negative  Cardiovascular: Negative  Gastrointestinal: Negative  Endocrine: Negative  Genitourinary: Negative  Musculoskeletal: Negative  Skin: Positive for wound  Allergic/Immunologic: Negative  Patient Active Problem List   Diagnosis    Prostate cancer ([facility] )    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     Past Medical History:   Diagnosis Date    Melanoma ([facility] )     Prostate CA ([place] [address] )      Past Surgical History:   Procedure Laterality Date    PROSTATE SURGERY       Family History   Problem Relation Age of Onset    Lung cancer Father      Social History     Social History    Marital status: /Civil Union     Spouse name: N/A    Number of children: N/A    Years of education: N/A     Occupational History    Not on file  Social History Main Topics    Smoking status: Never Smoker    Smokeless tobacco: Never Used    Alcohol use 0 6 oz/week     1 Cans of beer per week      Comment: one with dinner every night    Drug use: No    Sexual activity: Not on file     Other Topics Concern    Not on file     Social History Narrative    No narrative on file     No current outpatient prescriptions on file  No Known Allergies  There were no vitals filed for this visit  Physical Exam   Skin:   Skin incision clean dry and intact  Results:  Labs:  none    Imaging  No results found  I reviewed the above laboratory and imaging data  Discussion/Summary:  Left arm squamous cell cancer  Margins clear  Suture removed  Will follow up on a p r n  basis

## 2018-09-04 NOTE — ED PROVIDER NOTES
History  Chief Complaint   Patient presents with    Altered Mental Status     As per family, patient has been altered since this morning  States that patient has been falling a lot  patient does have history of dementia but is worse than normal       70-year-old male with dementia presents for evaluation of aggressive behavior this morning  Patient's daughter is at bedside and state that his dementia has been worsening over the last few years but he is now having aggressive episodes she  No of eyelid physical behavior but verbally aggressive screaming and yelling  Patient is currently calm and has no complaints  He is alert and oriented only to self  He believes the year is 200  Does not know who the president is  Daughter also reports frequent falls last 1 being yesterday and also the day before  Patient does not take any medications including anticoagulation or anti-platelet therapy  None       Past Medical History:   Diagnosis Date    Dementia     [facility]oma ([facility] )     Prostate [facility])        Past Surgical History:   Procedure Laterality Date    PROSTATE SURGERY         Family History   Problem Relation Age of Onset    Lung cancer Father      I have reviewed and agree with the history as documented  Social History   Substance Use Topics    Smoking status: Never Smoker    Smokeless tobacco: Never Used    Alcohol use 0 6 oz/week     1 Cans of beer per week      Comment: one with dinner every night        Review of Systems   Unable to perform ROS: Dementia   All other systems reviewed and are negative        Physical Exam  ED Triage Vitals [09/04/18 0957]   Temperature Pulse Respirations Blood Pressure SpO2   97 6 °F (36 4 °C) 85 18 137/60 97 %      Temp Source Heart Rate Source Patient Position - Orthostatic VS BP Location FiO2 (%)   Oral Monitor Sitting Right arm --      Pain Score       No Pain           Orthostatic Vital Signs  Vitals:    09/04/18 1345 09/04/18 1415 09/04/18 1425 09/04/18 1515   BP: 162/72 168/80 145/63 154/67   Pulse: 80 90 76 70   Patient Position - Orthostatic VS:   Sitting        Physical Exam   Constitutional: He is oriented to person, place, and time  He appears well-developed and well-nourished  No distress  HENT:   Head: Normocephalic and atraumatic  Eyes: Conjunctivae and EOM are normal    Neck: Normal range of motion  Neck supple  No cervical spine tenderness  Positive lower thoracic midline tenderness   Cardiovascular: Normal rate and regular rhythm  Pulmonary/Chest: Effort normal and breath sounds normal  No respiratory distress  He has no wheezes  He has no rales  Abdominal: Soft  Bowel sounds are normal  There is no tenderness  There is no guarding  Musculoskeletal: He exhibits no edema or tenderness  Neurological: He is alert and oriented to person, place, and time  No cranial nerve deficit  Skin: Skin is warm  He is not diaphoretic  No erythema  Psychiatric: He has a normal mood and affect  His behavior is normal    Nursing note and vitals reviewed        ED Medications  Medications   iohexol (OMNIPAQUE) 350 MG/ML injection (MULTI-DOSE) 100 mL (100 mL Intravenous Given 9/4/18 1327)   haloperidol lactate (HALDOL) injection 5 mg (5 mg Intramuscular Given 9/4/18 1502)       Diagnostic Studies  Results Reviewed     Procedure Component Value Units Date/Time    ED Urine Macroscopic [[id number]] Collected:  09/04/18 1437    Lab Status:  Final result Specimen:  Urine Updated:  09/04/18 1423     Color, UA Yellow     Clarity, UA Clear     pH, UA 7 0     Leukocytes, UA Negative     Nitrite, UA Negative     Protein, UA Negative mg/dl      Glucose, UA Negative mg/dl      Ketones, UA Negative mg/dl      Urobilinogen, UA 1 0 E U /dl      Bilirubin, UA Negative     Blood, UA Negative     Specific Gravity, UA 1 010    Narrative:       CLINITEK RESULT    POCT urinalysis dipstick [[id number]]  (Normal) Resulted:  09/04/18 1423    Lab Status:  Final result Updated: 09/04/18 1423     Color, UA yellow    Basic metabolic panel [[id number]] Collected:  09/04/18 1135    Lab Status:  Final result Specimen:  Blood from Arm, Left Updated:  09/04/18 1203     Sodium 138 mmol/L      Potassium 3 7 mmol/L      Chloride 102 mmol/L      CO2 29 mmol/L      ANION GAP 7 mmol/L      BUN 14 mg/dL      Creatinine 0 81 mg/dL      Glucose 102 mg/dL      Calcium 8 8 mg/dL      eGFR 78 ml/min/1 73sq m     Narrative:         National Kidney Disease Education Program recommendations are as follows:  GFR calculation is accurate only with a steady state creatinine  Chronic Kidney disease less than 60 ml/min/1 73 sq  meters  Kidney failure less than 15 ml/min/1 73 sq  meters  CBC and differential [[id number]]  (Abnormal) Collected:  09/04/18 1135    Lab Status:  Final result Specimen:  Blood from Arm, Left Updated:  09/04/18 1144     WBC 9 59 Thousand/uL      RBC 4 31 Million/uL      Hemoglobin 13 9 g/dL      Hematocrit 40 6 %      MCV 94 fL      MCH 32 3 pg      MCHC 34 2 g/dL      RDW 13 2 %      MPV 9 8 fL      Platelets 818 Thousands/uL      nRBC 0 /100 WBCs      Neutrophils Relative 79 (H) %      Immat GRANS % 0 %      Lymphocytes Relative 10 (L) %      Monocytes Relative 10 %      Eosinophils Relative 1 %      Basophils Relative 0 %      Neutrophils Absolute 7 58 Thousands/µL      Immature Grans Absolute 0 03 Thousand/uL      Lymphocytes Absolute 0 96 Thousands/µL      Monocytes Absolute 0 91 Thousand/µL      Eosinophils Absolute 0 07 Thousand/µL      Basophils Absolute 0 04 Thousands/µL                  CT chest abdomen pelvis w contrast   Final Result by Wing Jack DO (09/04 1514)      Limited study as above  No acute traumatic injury identified within the chest, abdomen or pelvis within limitations  Atherosclerotic vascular disease as above  Diffuse colonic diverticulosis        Workstation performed: [id number]         CT spine cervical without contrast   Final Result by Eric Khan MD [place] (09/04 1434)      No cervical spine fracture or traumatic malalignment  Osteopenia throughout the cervical spine with disorganized trabecula  Of uncertain etiology  Differential includes myeloma, severe osteopenia, and Paget's  Recommend follow-up MRI  The study was marked in EPIC for significant notification  Workstation performed: [id number]         CT head without contrast   Final Result by Avinash Nolasco MD (09/04 1425)      No acute intracranial abnormality  Microangiopathic changes  Workstation performed: [id number]               Procedures  Procedures      Phone Consults  ED Phone Contact    ED Course                               MDM  Number of Diagnoses or Management Options  Agitation:   Diagnosis management comments: 55-year-old male presenting with agitation and aggressive behavior  Symptoms likely secondary to worsening dementia  However, will further evaluate for medical cause including infection  With history of frequent falls and physical exam findings, will obtain CT head, neck, chest abdomen and pelvis to assess for any traumatic injuries  Case management consult regarding placement for patient  Will work on getting skilled nursing facility  PT/OT evaluation  Physical therapy recommending SNF however unable to arrange at this time due to insurance authorization  Patient's daughter is okay taking patient home and will stay with him tonight  She is aware that she has 24 hours to call case management tomorrow if she still wants to arrange skilled nursing  Patient is medically stable for discharge  Patient and family made aware of CT cervical spine results      CritCare Time    Disposition  Final diagnoses:   Agitation     Time reflects when diagnosis was documented in both MDM as applicable and the Disposition within this note     Time User Action Codes Description Comment    9/4/2018  4:34 PM Ricardo Springer Add [R45 1] Agitation ED Disposition     ED Disposition Condition Comment    Discharge  [address] discharge to home/self care  Condition at discharge: Stable        Follow-up Information     Follow up With Specialties Details Why Contact Info Additional Greg Owens 58 Internal Medicine In 2 days For further evaluation [address] Emergency Department Emergency Medicine  If symptoms worsen [address]  [phone]  ED, [address] [place] [address], [facility]          Patient's Medications   Discharge Prescriptions    OLANZAPINE (ZYPREXA ZYDIS) 10 MG DISINTEGRATING TABLET    Take 1 tablet (10 mg total) by mouth daily at bedtime       Start Date: 9/4/2018  End Date: --       Order Dose: 10 mg       Quantity: 8 tablet    Refills: 0     No discharge procedures on file  ED Provider  Attending physically available and evaluated [address]  I managed the patient along with the ED Attending      Electronically Signed by         Varun Burnett DO  09/04/18 9950

## 2018-09-04 NOTE — SOCIAL WORK
CM consulted to meet with Pt and family regarding possible placement  CM met with Pt and pt's daughter Nilam Villalobos reported that Pt and wife will be moving to an assisted living facility in October close to her home, which is about 2 hours away  Daughter is open to STR if recommended  Pt was recommended for STR  Nilam Villalobos wants to take Pt home  Nilam Villalobos reported she and her sister will both be staying with Pt and Pt's wife  CM offered home PT/OT  Nilam Villalobos declined stating, "he walks pretty well and we have canes and walkers in the home " Nilam Villalobos reported she will call CM back in the am if anything changes

## 2018-09-04 NOTE — PLAN OF CARE
Problem: OCCUPATIONAL THERAPY ADULT  Goal: Performs self-care activities at highest level of function for planned discharge setting  See evaluation for individualized goals  Treatment Interventions: ADL retraining, Functional transfer training, Endurance training, Patient/family training, Energy conservation, Activityengagement, Equipment evaluation/education  Equipment Recommended:  (TBD)       See flowsheet documentation for full assessment, interventions and recommendations  Limitation: Decreased ADL status, Decreased Safe judgement during ADL, Decreased cognition, Decreased endurance, Decreased self-care trans, Decreased high-level ADLs, Mood limitation  Prognosis: Fair  Assessment: 89 YO MALE SEEN FOR INITIAL OT EVAL FOLLOWING ADMISSION TO [facility] WITH ACUTE CHANGE IN MENTAL STATUS WITH AGGRESSIVE BEHAVIOR  PROBLEMS LIST INCLUDES BASELINE DEMENTIA, MULTIPLE FALLS, AND H/O PROSTATE CA  PT IS A POOR HISTORIAN, DAUGHTER PRESENT TO ASSIST WITH ANSWERING QUESTIONS  PT IS FROM HOME WITH [facility]OUSE WHERE HE REQUIRES ASSISTANCE FOR ADLS/IADLS FROM SPOUSE AT BASELINE  PT CURRENTLY REQUIRES OVERALL MOD-MAX A FOR ADLS AND  MIN A FOR TRANSFERS  INITIALLY TRIALED PT WITH HHA X2 FOR FUNCTIONAL MOBILITY  PT AGREEABLE TO TRIAL RW, REQUIRING MOD A FOR FUNCTIONAL MOBILITY WITH USE OF RW + MAX CUES REQUIRED T/O  PT IS LIMITED 2' IMPAIRED BALANCE, ATAXIC GAIT (SEE PT EVAL FOR FURTHER DETAILS), HIGH FALL RISK, BASELINE DEMENTIA WITH INCREASED CONFUSION, DISORIENTATION, COMBATIVE BEHAVIORS, OVERALL WEAKNESS AND LIMITED ACTIVITY TOLERANCE  PT'S DAUGHTER REPORTS PT'S SPOUSE IS HAVING A DIFFICULT TIME CARING FOR PT  CURRENT RECOMMENDATION FOR PT WOULD BE INPT REHAB VS PLACEMENT  WILL CONT TO FOLLOW TO ADDRESS THE BELOW DESCRIBED GOALS  OT Discharge Recommendation: Short Term Rehab (VS PLACEMENT )  OT - OK to Discharge:  Yes

## 2018-09-04 NOTE — ED NOTES
Pt with increased adgitaiton, screaming, yelling and grabbing at his daughters arm in room  MD Shruthi Arias brought in to see patient MD with request to give haldol 5mg IM        Lissett Douglas RN  09/04/18 1999

## 2018-09-04 NOTE — PROGRESS NOTES
Surgical Oncology Follow Up       [address] [facility] [place]  600 [facility] 20  [facility] 209 [facility] [id number] [phone]    Tee Ip  8/5/1928  [id number]  [address] [facility] [place]  [address] [facility] [address]  [address] [id number] [phone]    Chief Complaint   Patient presents with    Follow-up     Patient is here for a 2 week follow up after in-office excision of squamous cell carcinoma  Assessment/Plan:    No problem-specific Assessment & Plan notes found for this encounter  Diagnoses and all orders for this visit:    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        Advance Care Planning/Advance Directives:  Discussed disease status, cancer treatment plans and/or cancer treatment goals with the patient  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    6/6/2018 Initial Diagnosis     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         6/6/2018 Biopsy       Skin,  Left Elbow (4 slides [id number], L1 & L2,  [facility],collected on  06/06/2018):  - Ulcerated invasive squamous cell carcinoma, moderately to poorly differentiated with infiltrative     features, present with squamous cell carcinoma in-situ and occurring in a background of cellular     myxoid stroma with atypical features  See Note  8/17/2018 Surgery     In office excision SCC left elbow            History of Present Illness:   Patient is here for post procedural check, status post excision of left arm squamous cell cancer   -Interval History:  No skin issues since procedure was performed  Review of Systems:  Review of Systems   Constitutional: Negative  HENT: Negative  Eyes: Negative  Respiratory: Negative  Cardiovascular: Negative  Gastrointestinal: Negative  Endocrine: Negative  Genitourinary: Negative  Musculoskeletal: Negative      Skin: Positive for wound  Allergic/Immunologic: Negative  Patient Active Problem List   Diagnosis    Prostate cancer ([facility] )    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     Past Medical History:   Diagnosis Date    Melanoma ([facility] )     Prostate CA ([facility] )      Past Surgical History:   Procedure Laterality Date    PROSTATE SURGERY       Family History   Problem Relation Age of Onset    Lung cancer Father      Social History     Social History    Marital status: /Civil Union     Spouse name: N/A    Number of children: N/A    Years of education: N/A     Occupational History    Not on file  Social History Main Topics    Smoking status: Never Smoker    Smokeless tobacco: Never Used    Alcohol use 0 6 oz/week     1 Cans of beer per week      Comment: one with dinner every night    Drug use: No    Sexual activity: Not on file     Other Topics Concern    Not on file     Social History Narrative    No narrative on file     No current outpatient prescriptions on file  No Known Allergies  There were no vitals filed for this visit  Physical Exam   Skin:   Skin incision clean dry and intact  Results:  Labs:  none    Imaging  No results found  I reviewed the above laboratory and imaging data  Discussion/Summary:  Left arm squamous cell cancer  Margins clear  Suture removed  Will follow up on a p r n  basis

## 2018-09-04 NOTE — DISCHARGE INSTRUCTIONS
CT Cervical Spine: Osteopenia throughout the cervical spine with disorganized trabecula  Of uncertain etiology  Differential includes myeloma, severe osteopenia, and Paget's  Recommend follow-up MRI  Dementia   WHAT YOU NEED TO KNOW:   Dementia is a condition that causes loss of memory, thought control, and judgment  Alzheimer disease is the most common cause of dementia  Other common causes are loss of blood flow or nerve damage in the brain, and long-term alcohol or drug use  Dementia cannot be cured or prevented, but treatment may slow or reduce your symptoms  DISCHARGE INSTRUCTIONS:   Return to the emergency department if  you or someone close to you notices:  · You have signs of delirium, such as extreme confusion, and seeing or hearing things that are not there  · You become angry or violent, and cannot be calmed down  · You faint and cannot be woken  Contact your healthcare provider if  you or someone close to you notices:  · You have a fever  · You have increased confusion, behavior, or mood changes  · You have questions or concerns about your condition or care  Medicines: You may need any of the following:  · Antianxiety medicine  may be used to help reduce anxiety and keep you calm  · Antipsychotics  may be used to help control any anger or violence  · Antidepressants  may be used to help improve your mood and reduce your symptoms of depression  · Take your medicine as directed  Contact your healthcare provider if you think your medicine is not helping or if you have side effects  Tell him of her if you are allergic to any medicine  Keep a list of the medicines, vitamins, and herbs you take  Include the amounts, and when and why you take them  Bring the list or the pill bottles to follow-up visits  Carry your medicine list with you in case of an emergency    Follow up with your healthcare provider as directed:  Write down your questions so you remember to ask them during your visits  Ask someone to go in with you if you have trouble understanding or remembering what your healthcare provider tells you  The person can take notes for you during the visit and go over the notes with you later  Manage your dementia:  You may begin to need an in-home aide to help you remember your daily tasks  Ask your healthcare provider for a list of organizations that can help  It is best to arrange for help while you are thinking clearly  The following may also help you manage your dementia:  · Keep your mind and body active  Do activities that you love, such as art, gardening, or listening to music  Call or visit people often  This will keep your social skills sharp, and may help reduce depression  · Take all of your medicines as directed  This will help control medical conditions, such as high blood pressure, high cholesterol, and diabetes  · Write daily schedules and routines  Record doctor appointments, times to take your medicines, meal times, or any other things to remember  Write down reminders to use the bathroom if you have trouble remembering  You may to ask someone to write things down for you  · Place clocks and calendars where you can see them  This will help you remember appointments and tasks  · Do not smoke  Nicotine and other chemicals in cigarettes and cigars can cause lung damage  Ask your healthcare provider for information if you currently smoke and need help to quit  E-cigarettes or smokeless tobacco still contain nicotine  Talk to your healthcare provider before you use these products  · Eat healthy foods  Examples are fruits, vegetables, whole-grain breads, low-fat dairy, beans, lean meats, and fish  Ask if you need to be on a special diet  © 2017 [employer] [employer] Information is for End User's use only and may not be sold, redistributed or otherwise used for commercial purposes   All illustrations and images included in CareNotes® are the copyrighted property of [employer]  or Jourdan Romero  The above information is an  only  It is not intended as medical advice for individual conditions or treatments  Talk to your doctor, nurse or pharmacist before following any medical regimen to see if it is safe and effective for you

## 2018-09-04 NOTE — OCCUPATIONAL THERAPY NOTE
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     Past Medical History  Past Medical History:   Diagnosis Date    Dementia     [facility]an[facility] [address] [facility]     Prostate CA [facility])      Past Surgical History  Past Surgical History:   Procedure Laterality Date    PROSTATE SURGERY           09/04/18 1600   Note Type   Note type Eval/Treat   Restrictions/Precautions   Weight Bearing Precautions Per Order No   Other Precautions Combative; Impulsive;Cognitive; Chair Alarm; Bed Alarm; Fall Risk;Agitated   Pain Assessment   Pain Assessment No/denies pain   Pain Score No Pain   Home Living   Type of Home House   Home Layout Two level   Bathroom Toilet Standard   Home Equipment Walker;Cane   Additional Comments DAUGHTER PRESENT TO ASSIST WITH ANSWERING QUESTIONS  NO USE OF DME 2' AGITATED/DEMENTIA   Prior Function   Level of [place] Needs assistance with ADLs and functional mobility; Needs assistance with IADLs   Lives With Spouse   Receives Help From Family   ADL Assistance Needs assistance   IADLs Needs assistance   Falls in the last 6 months 1 to 4  (2 THIS WEEK )   Vocational Retired   Lifestyle   Autonomy DAUGHTER IS A POOR HISTORIAN 2' [address]  DAUGHTER PRESENT TO ASSIST WITH ANSWERING QUESTIONS  PT REQUIRES ASSIST FROM ELDERLY SPOUSE FOR ADLS/IADLS  Reciprocal Relationships LIVES WITH ELDERLY SPOUSE WHO PT'S DAUGHTER REPORTS IS HAVING A DIFFICULT TIME CARING FOR PT      Service to Others RETIRED   Intrinsic Gratification UNKNOWN    ADL   Eating Assistance 4  Minimal Assistance   Grooming Assistance 3  Moderate Assistance   UB Bathing Assistance 3  Moderate Assistance   LB Bathing Assistance 2  Maximal [address] 3  Moderate Assistance   LB Dressing Assistance 2  Maximal [address]  3  Moderate Assistance   Functional Assistance 2  Maximal Assistance   Bed Mobility   Supine to Sit 4  Minimal assistance   Additional items Assist x 1; Increased time required;Verbal cues;LE management;HOB elevated   Sit to Supine 4  Minimal assistance   Additional items Assist x 1; Increased time required;Verbal cues;LE management   Transfers   Sit to Stand 4  Minimal assistance   Additional items Assist x 1; Increased time required;Verbal cues   Stand to Sit 4  Minimal assistance   Additional items Assist x 1; Increased time required;Verbal cues   Functional Mobility   Functional Mobility 3  Moderate assistance   Additional Comments ATAXIC GAIT, SEE PT EVAL FOR FURTHER DETAILS  Additional items Rolling walker   Balance   Static Sitting Fair   Static Standing Poor   Ambulatory Poor   Activity Tolerance   Activity Tolerance Patient limited by fatigue; Other (Comment)  (COG)   Medical Staff Made Aware [facility]    Nurse Made Aware APPROPRIATE TO SEE PER RN    RUE Assessment   RUE Assessment WFL   LUE Assessment   LUE Assessment WFL   Hand Function   Gross Motor Coordination Functional   Fine Motor Coordination Functional   Cognition   Overall Cognitive Status Impaired   Arousal/Participation Responsive   Attention Difficulty attending to directions   Orientation Level Oriented to person;Disoriented to place; Disoriented to time;Disoriented to situation  (ORIENTED TO NAME ONLY )   Memory Decreased recall of precautions;Decreased recall of recent events;Decreased short term memory;Decreased recall of biographical information;Decreased long term memory   Following Commands Follows one step commands inconsistently   Comments PT WITH BASELINE DEMENTIA  DAUGHTER REPORTS PT WITH INCREASED CONFUSION/AGITATION COMPARED TO BASELINE  ORIENTED TO PERSON, INCLUDING NAME ONLY  FOLLOWS SIMPLE, ONE-STEP COMMANDS ~75% OF THE TIME  UPON ARRIVAL, PT ATTEMPTING TO GET OOB AND MILDLY COMBATING WITH DAUGHTER   PLEASANT WITH THERAPIST T/O  Assessment   Limitation Decreased ADL status; Decreased Safe judgement during ADL;Decreased cognition;Decreased endurance;Decreased self-care trans;Decreased high-level ADLs;Mood limitation   Prognosis Fair   Assessment 89 YO MALE SEEN FOR INITIAL OT EVAL FOLLOWING ADMISSION TO [facility] WITH ACUTE CHANGE IN MENTAL STATUS WITH AGGRESSIVE BEHAVIOR  PROBLEMS LIST INCLUDES BASELINE DEMENTIA, MULTIPLE FALLS, AND H/O PROSTATE CA  PT IS A POOR HISTORIAN, DAUGHTER PRESENT TO ASSIST WITH ANSWERING QUESTIONS  PT IS FROM HOME WITH ELDERLY SPOUSE WHERE HE REQUIRES ASSISTANCE FOR ADLS/IADLS FROM SPOUSE AT BASELINE  PT CURRENTLY REQUIRES OVERALL MOD-MAX A FOR ADLS AND  MIN A FOR TRANSFERS  INITIALLY TRIALED PT WITH HHA X2 FOR FUNCTIONAL MOBILITY  PT AGREEABLE TO TRIAL RW, REQUIRING MOD A FOR FUNCTIONAL MOBILITY WITH USE OF RW + MAX CUES REQUIRED T/O  PT IS LIMITED 2' IMPAIRED BALANCE, ATAXIC GAIT (SEE PT EVAL FOR FURTHER DETAILS), HIGH FALL RISK, BASELINE DEMENTIA WITH INCREASED CONFUSION, DISORIENTATION, COMBATIVE BEHAVIORS, OVERALL WEAKNESS AND LIMITED ACTIVITY TOLERANCE  PT'S DAUGHTER REPORTS PT'S SPOUSE IS HAVING A DIFFICULT TIME CARING FOR PT  CURRENT RECOMMENDATION FOR PT WOULD BE INPT REHAB VS PLACEMENT  WILL CONT TO FOLLOW TO ADDRESS THE BELOW DESCRIBED GOALS  Goals   Patient Goals TO SLEEP-AT END OF SESSION    LTG Time Frame 10-14   Long Term Goal #1 SEE BELOW   Plan   Treatment Interventions ADL retraining;Functional transfer training; Endurance training;Patient/family training;Energy conservation; Activityengagement;Equipment evaluation/education   Goal Expiration Date 09/18/18   OT Frequency 2-3x/wk   Recommendation   OT Discharge Recommendation Short Term Rehab  (VS PLACEMENT )   Equipment Recommended (TBD)   OT - OK to Discharge Yes   Barthel Index   Feeding 5   Bathing 0   Grooming Score 0   Dressing Score 5   Bladder Score 5   Bowels Score 5   Toilet Use Score 5   Transfers (Bed/Chair) Score 5   Mobility (Level Surface) Score 0   Stairs Score 0   Barthel Index Score 30   Modified Pilger Scale   Modified Pilger Scale 4       OCCUPATIONAL THERAPY GOALS TO BE MET WITHIN 10-14 DAYS:    -Pt will increase attention to task to ~10 minutes in order to increase participation in self-care tasks  -Pt will increase bed mobility to  S level to participate in functional activities with G tolerance and balance  -Pt will improve functional mobility and transfers to S level on/off all surfaces w/ G balance/safety including toileting   -Pt will increase independence in all ADLS to MIN A  with G balance sitting upright in chair   -Pt will improve activity tolerance to G for 20 min txment sessions in order to increase participation in functional tasks    -Caregiver will be 100% attentive during caregiver training to assist with safe d/c plan         Documentation completed by Latha Reid, KIAN, OTR/L

## 2018-09-04 NOTE — PHYSICAL THERAPY NOTE
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       Past Medical History  Past Medical History:   Diagnosis Date    Dementia     [facility]     [facility])        Past Surgical History  Past Surgical History:   Procedure Laterality Date    PROSTATE SURGERY        09/04/18 1601   Note Type   Note type Eval/Treat   Pain Assessment   Pain Assessment No/denies pain   Pain Score No Pain   Home Living   Type of Home House   Home Layout Two level   Home Equipment Walker;Cane  (dtr reports he was not using with dementia)   Prior Function   Level of [place] Needs assistance with IADLs; Needs assistance with ADLs and functional mobility   Lives With Spouse   Receives Help From Family   ADL Assistance Needs assistance   IADLs Needs assistance   Falls in the last 6 months 1 to 4  (dtr reports she knows of at least 2 this week)   Restrictions/Precautions   Weight Bearing Precautions Per Order No   Other Precautions Combative; Impulsive;Cognitive;Multiple lines; Fall Risk   General   Family/Caregiver Present Yes   Cognition   Orientation Level Disoriented X4   RLE Assessment   RLE Assessment WFL   LLE Assessment   LLE Assessment WFL   Coordination   Movements are Fluid and Coordinated 0   Coordination and Movement Description ataxic, parkinsonian gait   Bed Mobility   Supine to Sit 4  Minimal assistance   Additional items Verbal cues; Impulsive   Sit to Supine 4  Minimal assistance   Additional items Assist x 1; Increased time required;LE management   Transfers   Sit to Stand 4  Minimal assistance   Additional items Assist x 1; Increased time required   Stand to Sit 4  Minimal assistance   Additional items Assist x 1; Increased time required   Ambulation/Elevation   Gait pattern Narrow SHARON;Ataxia; Shuffling;Decreased foot clearance;R Foot drag  (parkinsonian gait)   Gait Assistance 3  Moderate assist   Additional items Assist x 1   Assistive Device Rolling walker   Distance 80   Balance   Static Sitting Fair   Dynamic Sitting Fair -   Static Standing Poor   Dynamic Standing Poor   Ambulatory Poor   Endurance Deficit   Endurance Deficit Yes   Activity Tolerance   Activity Tolerance Patient limited by fatigue;Patient limited by pain   Nurse Made Aware yes, spoke to nsg Jyoti Steel for clearance to work with pt   Assessment   Prognosis Guarded   Problem List Decreased safety awareness;Pain;Decreased coordination;Decreased cognition; Impaired judgement;Decreased mobility; Impaired balance;Decreased endurance   Assessment Pt is 80 y o  male seen for PT evaluation s/p admit to [facility] on 9/4/2018 w/ acute change in mental status and increased aggression  PT consulted to assess pt's functional mobility and d/c needs  Order placed for PT eval and tx  Comorbidities affecting pt's physical performance at time of assessment include: confusion, dementia  PTA, pt was ambulates household distances and lives in multi-level home  Personal factors affecting pt at time of IE include: inaccessible home environment, lives in 2 story house, unable to perform dynamic tasks in community, decreased cognition, limited home support, positive fall history, decreased initiation and engagement, limited insight into impairments, inability to perform IADLs and inability to perform ADLs  Please find objective findings from PT assessment regarding body systems outlined above with impairments and limitations including impaired balance, decreased endurance, impaired coordination, gait deviations, pain, decreased activity tolerance, decreased functional mobility tolerance, decreased safety awareness, fall risk and decreased cognition   Pt demonstrated ability to complete bed mobility with Teri and single step instruction for safety  Initially ambulating demonstrated very unsteady gait requiring HHA x2 to maintain balance  Trial with RW which pt was able to be managed with Mod Ax1  Noted ataxic, parkinsionism type gait, with decreased foot clearance, festating, and R foot drag  Pt required constant A for device management and instruction for distance  Anticipate pt may require STR vs placement at this time as pt lives in multilevel home with only spouse  Daughter who is with the patient today lives 2 hours away  The following objective measures performed on IE also reveal limitations: Barthel Index: 35/100  Pt's clinical presentation is currently evolving seen in pt's presentation of acute confusion and agitation  Pt to benefit from continued PT tx to address deficits as defined above and maximize level of functional independent mobility and consistency  From PT/mobility standpoint, recommendation at time of d/c would be STR vs placement pending pt ability to progress with PT pending progress in order to facilitate return to PLOF  Barriers to Discharge Inaccessible home environment;Decreased caregiver support  (agitiation)   Goals   Patient Goals To get up and leave   STG Expiration Date 09/16/18   Short Term Goal #1 1  Complete bed mobility with distant S to decrease need for A at home  2  Complete transfers with distant S to decrease need for A at home  3  Ambulate 150' with least restrictive AD and S  4  Improve dynamic balance to good to decrease risk of additional falls   Plan   Treatment/Interventions Bed mobility;Gait training; Endurance training;Cognitive reorientation;LE strengthening/ROM; Functional transfer training;Spoke to nursing; Patient/family training;OT   PT Frequency 2-3x/wk   Recommendation   Recommendation Short-term skilled PT  (vs placement pending ability to progress)   PT - OK to Discharge (to facility with increased A)   Barthel Index   Feeding 5   Bathing 0   Grooming Score 5   Dressing Score 5   Bladder Score 5 Bowels Score 5   Toilet Use Score 5   Transfers (Bed/Chair) Score 5   Mobility (Level Surface) Score 0   Stairs Score 0   Barthel Index Score 35           Nancy Ambrocio, PT

## 2018-09-05 NOTE — SOCIAL WORK
CM received call from Pt's daughter, Kyle Boogie [phone], stating she was interested in sending Pt to a SNF for STR  Pt was d/c yesterday  Kyle Boogie reported [facility] [address] [facility]  Kyle Boogie also reported she has an appointment with [facility] at 1:30  CM contacted [facility] and spoke to [place] to start authorization process  Clinical information faxed to [phone]  CM received call from [facility] inquiring about the authorization  CM reported the authorization was not yet completed and asked if [facility] was going to accept Pt, who stated they are unsure if they have a bed at this time  CM then received call stating they do not have bed  CM spoke with Kyle Abbeagustina, and Tash Bustillos from [facility] inquired if Kyle Boogie was interested in sending out additional referrals to other in network facilities  Tash Bustillos told Kyle Boogie that [facility] would probably have an open bed available next Wednesday  CM reported the PT/OT notes from yesterday would not be current enough to submit for auth  Kyle Boogie declined the offer to send out additional referrals and would instead prefer [place] [phone]  Family agreeable to set up [place] [phone] with [facility] referral sent  [facility] able to accept  CM contacted [facility] to cancel the auth

## 2024-04-10 NOTE — ED ATTENDING ATTESTATION
Marci Kilpatrick DO, saw and evaluated the patient  I have discussed the patient with the resident/non-physician practitioner and agree with the resident's/non-physician practitioner's findings, Plan of Care, and MDM as documented in the resident's/non-physician practitioner's note, except where noted  All available labs and Radiology studies were reviewed  At this point I agree with the current assessment done in the Emergency Department  I have conducted an independent evaluation of this patient a history and physical is as follows:    Emergency Department Note- Jesse Lane 80 y o  male MRN: [id number]    Unit/Bed#: ED 02 Encounter: [id number]    Jesse Lane is a 80 y o  male who presents with   Chief Complaint   Patient presents with    Altered Mental Status     As per family, patient has been altered since this morning  States that patient has been falling a lot  patient does have history of dementia but is worse than normal           History of Present Illness   HPI:  Jesse Lane is a 80 y o  male who presents with   Change in behavior  Patient became out verbally aggressive toward family members today  Patient has history of dementia but has never acted like this before  He becomes belligerent and does not want to follow directions the hand today as well  The reportedly has had approximately 3 falls at home over the last 2 weeks, the last being in 2 days ago  No outward signs of trauma  Did have some mild tenderness in the left ribcage laterally  No ecchymosis, zoster rash  Patient is afebrile  Patient is confused but currently cooperative and interactive without difficulty  Will obtain CT scans of the head, neck, chest abdomen pelvis, check labs including urinalysis, have patient evaluated by case management  ROS is otherwise unremarkable      Historical Information   Past Medical History:   Diagnosis Date    Dementia     [facility] [address] [facility]     [facility] Past Surgical History:   Procedure Laterality Date    PROSTATE SURGERY       Social History   History   Alcohol Use    0 6 oz/week    1 Cans of beer per week     Comment: one with dinner every night     History   Drug Use No     History   Smoking Status    Never Smoker   Smokeless Tobacco    Never Used       Family History:   Meds/Allergies     No Known Allergies    Objective   First Vitals:   Blood Pressure: 137/60 (18)  Pulse: 85 (18)  Temperature: 97 6 °F (36 4 °C) (18)  Temp Source: Oral (18)  Respirations: 18 (18)  Height: 6' 2" (188 cm) (18)  Weight - Scale: 74 4 kg (164 lb 0 4 oz) (18)  SpO2: 97 % (18)    Current Vitals:   Blood Pressure: 145/63 (18 1425)  Pulse: 76 (18 142)  Temperature: 97 6 °F (36 4 °C) (18)  Temp Source: Oral (18)  Respirations: 18 (18)  Height: 6' 2" (188 cm) (18)  Weight - Scale: 74 4 kg (164 lb 0 4 oz) (18)  SpO2: 98 % (18 142)      Intake/Output Summary (Last 24 hours) at 18 1504  Last data filed at 18 1423   Gross per 24 hour   Intake                0 ml   Output              800 ml   Net             -800 ml       Invasive Devices     Peripheral Intravenous Line            Peripheral IV 18 Left Antecubital less than 1 day                      Medical Decision Makin  CT is negative for any acute traumatic pathology  Labs reviewed  Will obtain a physical therapy consultation and evaluation and reassess  Pt evaluated by PT and is ambulatory with assistance  Case management provided family with outpatient resources and family is comfortable taking him home at this time       Recent Results (from the past 36 hour(s))   Basic metabolic panel    Collection Time: 18 11:35 AM   Result Value Ref Range    Sodium 138 136 - 145 mmol/L    Potassium 3 7 3 5 - 5 3 mmol/L    Chloride 102 100 - 108 mmol/L    CO2 29 21 - 32 mmol/L    ANION GAP 7 4 - 13 mmol/L    BUN 14 5 - 25 mg/dL    Creatinine 0 81 0 60 - 1 30 mg/dL    Glucose 102 65 - 140 mg/dL    Calcium 8 8 8 3 - 10 1 mg/dL    eGFR 78 ml/min/1 73sq m   CBC and differential    Collection Time: 09/04/18 11:35 AM   Result Value Ref Range    WBC 9 59 4 31 - 10 16 Thousand/uL    RBC 4 31 3 88 - 5 62 Million/uL    Hemoglobin 13 9 12 0 - 17 0 g/dL    Hematocrit 40 6 36 5 - 49 3 %    MCV 94 82 - 98 fL    MCH 32 3 26 8 - 34 3 pg    MCHC 34 2 31 4 - 37 4 g/dL    RDW 13 2 11 6 - 15 1 %    MPV 9 8 8 9 - 12 7 fL    Platelets 055 418 - 746 Thousands/uL    nRBC 0 /100 WBCs    Neutrophils Relative 79 (H) 43 - 75 %    Immat GRANS % 0 0 - 2 %    Lymphocytes Relative 10 (L) 14 - 44 %    Monocytes Relative 10 4 - 12 %    Eosinophils Relative 1 0 - 6 %    Basophils Relative 0 0 - 1 %    Neutrophils Absolute 7 58 1 85 - 7 62 Thousands/µL    Immature Grans Absolute 0 03 0 00 - 0 20 Thousand/uL    Lymphocytes Absolute 0 96 0 60 - 4 47 Thousands/µL    Monocytes Absolute 0 91 0 17 - 1 22 Thousand/µL    Eosinophils Absolute 0 07 0 00 - 0 61 Thousand/µL    Basophils Absolute 0 04 0 00 - 0 10 Thousands/µL   POCT urinalysis dipstick    Collection Time: 09/04/18  2:23 PM   Result Value Ref Range    Color, UA yellow    ED Urine Macroscopic    Collection Time: 09/04/18  2:37 PM   Result Value Ref Range    Color, UA Yellow     Clarity, UA Clear     pH, UA 7 0 4 5 - 8 0    Leukocytes, UA Negative Negative    Nitrite, UA Negative Negative    Protein, UA Negative Negative mg/dl    Glucose, UA Negative Negative mg/dl    Ketones, UA Negative Negative mg/dl    Urobilinogen, UA 1 0 0 2, 1 0 E U /dl E U /dl    Bilirubin, UA Negative Negative    Blood, UA Negative Negative    Specific Gravity, UA 1 010 1 003 - 1 030     CT spine cervical without contrast   Final Result      No cervical spine fracture or traumatic malalignment        Osteopenia throughout the cervical spine with disorganized trabecula  Of uncertain etiology  Differential includes myeloma, severe osteopenia, and Paget's  Recommend follow-up MRI  The study was marked in EPIC for significant notification  Workstation performed: [id number]         CT head without contrast   Final Result      No acute intracranial abnormality  Microangiopathic changes  Workstation performed: [id number]         CT chest abdomen pelvis w contrast    (Results Pending)         Portions of the record may have been created with voice recognition software  Occasional wrong word or "sound a like" substitutions may have occurred due to the inherent limitations of voice recognition software          Critical Care Time  CritCare Time    Procedures 10-Apr-2024 15:00